# Patient Record
Sex: MALE | Race: WHITE
[De-identification: names, ages, dates, MRNs, and addresses within clinical notes are randomized per-mention and may not be internally consistent; named-entity substitution may affect disease eponyms.]

---

## 2018-08-31 ENCOUNTER — HOSPITAL ENCOUNTER (EMERGENCY)
Dept: HOSPITAL 74 - FER | Age: 38
Discharge: LEFT BEFORE DISCHARGE | End: 2018-08-31
Payer: SELF-PAY

## 2018-08-31 VITALS — BODY MASS INDEX: 25.1 KG/M2

## 2018-08-31 VITALS — HEART RATE: 98 BPM | SYSTOLIC BLOOD PRESSURE: 123 MMHG | DIASTOLIC BLOOD PRESSURE: 84 MMHG | TEMPERATURE: 98.3 F

## 2018-08-31 DIAGNOSIS — M79.602: Primary | ICD-10-CM

## 2018-08-31 DIAGNOSIS — F17.210: ICD-10-CM

## 2018-08-31 NOTE — PDOC
History of Present Illness





- General


Chief Complaint: Pain


Stated Complaint: ONGOING  PAIN TO RIGHT ARM SINCE FALL 11 DAYS AGO


Time Seen by Provider: 08/31/18 13:11





Past History





- Past Medical History


Allergies/Adverse Reactions: 


 Allergies











Allergy/AdvReac Type Severity Reaction Status Date / Time


 


Penicillins Allergy   Verified 08/31/18 13:10











Home Medications: 


Ambulatory Orders





Tramadol HCl 50 mg PO Q4HWA 08/31/18 











- Suicide/Smoking/Psychosocial Hx


Smoking Status: Yes


Smoking History: Current every day smoker


Number of Cigarettes Smoked Daily: 2





Medical Decision Making





- Medical Decision Making





08/31/18 13:17


PAtient presented to the ED complaining of R arm and neck pain after fall last 

week.  States that he was seen in an urgent care last week and was given 

tramadol and told to have an MRI.  Patient chose to walk out during my 

evaluation, despite my offers to examine him and provide medical care.  Stated 

that "I'm just going to leave because you guys aren't doing any thing for me".  

Eloped from the ED before medical examination and  treatment was complete. 





*DC/Admit/Observation/Transfer


Diagnosis at time of Disposition: 


Arm pain


Qualifiers:


 Laterality: left Qualified Code(s): M79.602 - Pain in left arm








- Discharge Dispostion


Disposition: ELOPED


Condition at time of disposition: Good


Decision to Admit order: No





- Referrals





- Patient Instructions





- Post Discharge Activity